# Patient Record
Sex: FEMALE | Race: OTHER | HISPANIC OR LATINO | URBAN - METROPOLITAN AREA
[De-identification: names, ages, dates, MRNs, and addresses within clinical notes are randomized per-mention and may not be internally consistent; named-entity substitution may affect disease eponyms.]

---

## 2023-03-22 NOTE — ASU PATIENT PROFILE, ADULT - NS PREOP UNDERSTANDS INFO
Preop Instructions given to patients daughter, Pt to be NPO for 10 hours before surgery. No chewing gum or hard candy once NPO. No jewellery or make up or body piercing allowed in the OR/yes

## 2023-03-23 ENCOUNTER — OUTPATIENT (OUTPATIENT)
Dept: OUTPATIENT SERVICES | Facility: HOSPITAL | Age: 73
LOS: 1 days | Discharge: HOME | End: 2023-03-23

## 2023-03-23 VITALS
DIASTOLIC BLOOD PRESSURE: 87 MMHG | SYSTOLIC BLOOD PRESSURE: 134 MMHG | OXYGEN SATURATION: 98 % | RESPIRATION RATE: 15 BRPM | HEART RATE: 62 BPM | TEMPERATURE: 97 F

## 2023-03-23 VITALS
SYSTOLIC BLOOD PRESSURE: 125 MMHG | OXYGEN SATURATION: 99 % | TEMPERATURE: 97 F | DIASTOLIC BLOOD PRESSURE: 68 MMHG | RESPIRATION RATE: 15 BRPM | HEIGHT: 66 IN | WEIGHT: 191.58 LBS | HEART RATE: 64 BPM

## 2023-03-23 DIAGNOSIS — Z90.710 ACQUIRED ABSENCE OF BOTH CERVIX AND UTERUS: Chronic | ICD-10-CM

## 2023-03-23 DEVICE — LENS IOL PC ACR SA60AT PLUS 23.0D
Type: IMPLANTABLE DEVICE | Status: NON-FUNCTIONAL
Removed: 2023-03-23

## 2023-03-23 RX ORDER — PHENYLEPHRINE HCL 2.5 %
1 DROPS OPHTHALMIC (EYE)
Refills: 0 | Status: COMPLETED | OUTPATIENT
Start: 2023-03-23 | End: 2023-03-23

## 2023-03-23 RX ORDER — TROPICAMIDE 1 %
1 DROPS OPHTHALMIC (EYE)
Refills: 0 | Status: COMPLETED | OUTPATIENT
Start: 2023-03-23 | End: 2023-03-23

## 2023-03-23 RX ORDER — KETOROLAC TROMETHAMINE 0.5 %
1 DROPS OPHTHALMIC (EYE)
Refills: 0 | Status: COMPLETED | OUTPATIENT
Start: 2023-03-23 | End: 2023-03-23

## 2023-03-23 RX ORDER — ATROPINE SULFATE 1 %
1 DROPS OPHTHALMIC (EYE)
Refills: 0 | Status: COMPLETED | OUTPATIENT
Start: 2023-03-23 | End: 2023-03-23

## 2023-03-23 RX ORDER — OFLOXACIN 0.3 %
1 DROPS OPHTHALMIC (EYE)
Refills: 0 | Status: COMPLETED | OUTPATIENT
Start: 2023-03-23 | End: 2023-03-23

## 2023-03-23 RX ORDER — ACETAMINOPHEN 500 MG
2 TABLET ORAL
Qty: 0 | Refills: 0 | DISCHARGE
Start: 2023-03-23

## 2023-03-23 RX ORDER — SODIUM CHLORIDE 9 MG/ML
1000 INJECTION, SOLUTION INTRAVENOUS
Refills: 0 | Status: DISCONTINUED | OUTPATIENT
Start: 2023-03-23 | End: 2023-03-23

## 2023-03-23 RX ORDER — ACETAMINOPHEN 500 MG
650 TABLET ORAL ONCE
Refills: 0 | Status: DISCONTINUED | OUTPATIENT
Start: 2023-03-23 | End: 2023-03-23

## 2023-03-23 RX ADMIN — Medication 1 DROP(S): at 14:53

## 2023-03-23 RX ADMIN — Medication 1 DROP(S): at 15:02

## 2023-03-23 RX ADMIN — Medication 1 DROP(S): at 14:46

## 2023-03-23 RX ADMIN — Medication 1 DROP(S): at 15:01

## 2023-03-23 RX ADMIN — Medication 1 DROP(S): at 14:52

## 2023-03-23 RX ADMIN — Medication 1 DROP(S): at 14:45

## 2023-03-23 NOTE — OPERATIVE REPORT - OPERATIVE RPOSRT DETAILS
PRE-OP DIAGNOSIS: Cataract__left____ EYE    POST-OP DIAGNOSIS: SAME    ANESTHESIA: MAC    PROCEDURE: Cataract____left__ EYE    SPECIMEN/TISSUE REMOVED: None    ESTIMATED BLOOD LOSS: < 1mL    COMPLICATIONS: None    The patient was brought into the operating room, where an intravenous line was inserted.  The patient was then prepped and draped in the usual sterile fashion for eye surgery of the operated eye.  The lid speculum was inserted into the operated eye.     A paracentesis was performed using a fifteen degree blade.  One percent preservative free lidocaine was injected into the anterior chamber.  Trypan blue was injected into the anterior chamber and irrigated out with balanced salt solution.  The anterior chamber was filled with Viscoat. A 2.75 mm keratome was used to make a clear corneal incision.  The cytotome and Utrata forceps were used to make a continuous curvilinear capsulorrhexis.  The lens was hydrodissected and hydrodelineated with balanced salt solution, until it was freely movable.   The phacoemulsification handpiece was used to groove the lens nucleus into four quadrants, which were then removed.  The remaining cortex was removed using irrigation and aspiration.  The anterior chamber and capsular bag were filled with Healon, and the posterior capsule was noted to be clear and intact.    An Betito lens model SA60AT power ___23.0_ was injected into the capsular bag without complications.  The lens was centered with a Sinsky hook. The remaining Healon was removed with irrigation and aspiration on the viscoelastic setting.  Miochol was injected into the anterior chamber to constrict the pupil and pull the iris from the corneal wounds.  The anterior chamber was maintained with balanced salt solution and the corneal wounds were hydrated, and noted to be tight and secure.  The lid speculum was removed from the eye.  The patient received topical Vigamox and pred forte eye drops.  The patient also received Tobradex eye ointment, and the eye was patched and shielded.  The patient was brought to the recovery room in stable condition, alert and oriented times three.  The patient was reminded to follow up in the office the next day.

## 2023-03-29 NOTE — ASU PATIENT PROFILE, ADULT - NSICDXPASTMEDICALHX_GEN_ALL_CORE_FT
PAST MEDICAL HISTORY:  Mild HTN no meds    Ovarian cancer      PAST MEDICAL HISTORY:  Mild HTN no meds    Ovarian cancer 1999

## 2023-03-29 NOTE — ASU PATIENT PROFILE, ADULT - FALL HARM RISK - UNIVERSAL INTERVENTIONS
Bed in lowest position, wheels locked, appropriate side rails in place/Call bell, personal items and telephone in reach/Instruct patient to call for assistance before getting out of bed or chair/Non-slip footwear when patient is out of bed/[place] to call system/Physically safe environment - no spills, clutter or unnecessary equipment/Purposeful Proactive Rounding/Room/bathroom lighting operational, light cord in reach

## 2023-03-30 ENCOUNTER — OUTPATIENT (OUTPATIENT)
Dept: OUTPATIENT SERVICES | Facility: HOSPITAL | Age: 73
LOS: 1 days | Discharge: HOME | End: 2023-03-30

## 2023-03-30 VITALS
HEIGHT: 66 IN | OXYGEN SATURATION: 97 % | DIASTOLIC BLOOD PRESSURE: 70 MMHG | WEIGHT: 192.02 LBS | TEMPERATURE: 97 F | RESPIRATION RATE: 14 BRPM | SYSTOLIC BLOOD PRESSURE: 128 MMHG | HEART RATE: 63 BPM

## 2023-03-30 VITALS
OXYGEN SATURATION: 98 % | HEART RATE: 61 BPM | SYSTOLIC BLOOD PRESSURE: 127 MMHG | TEMPERATURE: 99 F | RESPIRATION RATE: 16 BRPM | DIASTOLIC BLOOD PRESSURE: 62 MMHG

## 2023-03-30 DIAGNOSIS — Z90.710 ACQUIRED ABSENCE OF BOTH CERVIX AND UTERUS: Chronic | ICD-10-CM

## 2023-03-30 DEVICE — LENS IOL PC ACR SA60AT PLUS 22.5D
Type: IMPLANTABLE DEVICE | Site: RIGHT (RIGHT EYE) | Status: NON-FUNCTIONAL
Removed: 2023-03-30

## 2023-03-30 RX ORDER — TROPICAMIDE 1 %
1 DROPS OPHTHALMIC (EYE)
Refills: 0 | Status: COMPLETED | OUTPATIENT
Start: 2023-03-30 | End: 2023-03-30

## 2023-03-30 RX ORDER — PHENYLEPHRINE HCL 2.5 %
1 DROPS OPHTHALMIC (EYE)
Refills: 0 | Status: COMPLETED | OUTPATIENT
Start: 2023-03-30 | End: 2023-03-30

## 2023-03-30 RX ORDER — OFLOXACIN 0.3 %
1 DROPS OPHTHALMIC (EYE)
Refills: 0 | Status: COMPLETED | OUTPATIENT
Start: 2023-03-30 | End: 2023-03-30

## 2023-03-30 RX ORDER — SODIUM CHLORIDE 9 MG/ML
1000 INJECTION, SOLUTION INTRAVENOUS
Refills: 0 | Status: DISCONTINUED | OUTPATIENT
Start: 2023-03-30 | End: 2023-03-30

## 2023-03-30 RX ORDER — ACETAMINOPHEN 500 MG
650 TABLET ORAL ONCE
Refills: 0 | Status: DISCONTINUED | OUTPATIENT
Start: 2023-03-30 | End: 2023-03-30

## 2023-03-30 RX ORDER — KETOROLAC TROMETHAMINE 0.5 %
1 DROPS OPHTHALMIC (EYE)
Refills: 0 | Status: COMPLETED | OUTPATIENT
Start: 2023-03-30 | End: 2023-03-30

## 2023-03-30 RX ORDER — ONDANSETRON 8 MG/1
4 TABLET, FILM COATED ORAL ONCE
Refills: 0 | Status: DISCONTINUED | OUTPATIENT
Start: 2023-03-30 | End: 2023-03-30

## 2023-03-30 RX ORDER — ATROPINE SULFATE 1 %
1 DROPS OPHTHALMIC (EYE)
Refills: 0 | Status: COMPLETED | OUTPATIENT
Start: 2023-03-30 | End: 2023-03-30

## 2023-03-30 RX ADMIN — Medication 1 DROP(S): at 13:44

## 2023-03-30 RX ADMIN — Medication 1 DROP(S): at 13:49

## 2023-03-30 RX ADMIN — Medication 1 DROP(S): at 13:55

## 2023-03-30 RX ADMIN — Medication 1 DROP(S): at 13:48

## 2023-03-30 RX ADMIN — Medication 1 DROP(S): at 13:57

## 2023-03-30 RX ADMIN — Medication 1 DROP(S): at 13:43

## 2023-03-30 NOTE — OPERATIVE REPORT - OPERATIVE RPOSRT DETAILS
PRE-OP DIAGNOSIS: Cataract__right___ EYE    POST-OP DIAGNOSIS: SAME    ANESTHESIA: MAC    PROCEDURE: Cataract____right _ EYE    SPECIMEN/TISSUE REMOVED: None    ESTIMATED BLOOD LOSS: < 1mL    COMPLICATIONS: None    The patient was brought into the operating room, where an intravenous line was inserted.  The patient was then prepped and draped in the usual sterile fashion for eye surgery of the operated eye.  The lid speculum was inserted into the operated eye.     A paracentesis was performed using a fifteen degree blade.  One percent preservative free lidocaine was injected into the anterior chamber.  Epinephrine was injected into the anterior chamber. Trypan blue was injected into the anterior chamber and irrigated out with balanced salt solution.  The anterior chamber was filled with Viscoat. A 2.75 mm keratome was used to make a clear corneal incision.  The cytotome and Utrata forceps were used to make a continuous curvilinear capsulorrhexis.  The lens was hydrodissected and hydrodelineated with balanced salt solution, until it was freely movable.   The phacoemulsification handpiece was used to groove the lens nucleus into four quadrants, which were then removed.  The remaining cortex was removed using irrigation and aspiration.  The anterior chamber and capsular bag were filled with Healon, and the posterior capsule was noted to be clear and intact.    An Betito lens model SA60AT power ___22.5_ was injected into the capsular bag without complications.  The lens was centered with a Sinsky hook. The remaining Healon was removed with irrigation and aspiration on the viscoelastic setting.  Miochol was injected into the anterior chamber to constrict the pupil and pull the iris from the corneal wounds.  The anterior chamber was maintained with balanced salt solution and the corneal wounds were hydrated, and noted to be tight and secure.  The lid speculum was removed from the eye.  The patient received topical Vigamox and pred forte eye drops.  The patient also received Tobradex eye ointment, and the eye was patched and shielded.  The patient was brought to the recovery room in stable condition, alert and oriented times three.  The patient was reminded to follow up in the office the next day.

## 2023-10-30 NOTE — ASU PATIENT PROFILE, ADULT - NS TRANSFER DISPOSITION PATIENT BELONGINGS
Nutrition Assessment   Reason for Consult/Assessment: Initial      Diagnosis and Hx: Reviewed    Pertinent Nutrition History: COVID, sepsis.    Pertinent Nutrition Information: Pt is underweight. Pt was a asleep at time of visit. Per RN pt takes a few bites of food and then falls back asleep. Very poor po intake. No recent weight hx to review.                                 Diet Order: IDDSI 4 Pureed (Dysphagia)                  Diet tolerance: Tolerating   Food Allergies: None known    Demographic/Anthropometrics Information  Gender: male  Patient Age: 74 year old  Height:   Ht Readings from Last 1 Encounters:   10/27/23 5' 6\" (1.676 m)      Weight:   Wt Readings from Last 1 Encounters:   10/30/23 50.5 kg      BMI:   BMI Readings from Last 1 Encounters:   10/30/23 17.97 kg/m²       Usual Weight: 50.5 kg  % Weight Change: no hx                                             NFPE  Nutrition Focused Physical Exam  Physical Exam Completed: No (pt sleeping, airborne isolation)        Skin Assessment/Wounds  Wounds Present: Wounds present (sacral ulcer)             TREATMENT PLAN: Monitoring & Interventions   Intervention: Coordination of nutrition care by a nutrition professional, Meals and snacks, Nutrition supplement therapy      Coordination of nutrition care: Encourage po intake. IF poor po continues will need to consider alternative forms of nutrition        Meals & snacks: Diet per speech                                                           Nutrition supplement therapy: Add Magic cup tid.       Goal: Increase oral intake to >/equal 50% of meals and supplements   Intervention goal status: Initiated  Time frame to achieve goal: 3-5 days     Dietitian will monitor: Anthropometric Measurements, Food, beverage, and nutrient intake, Biochemical data, medical tests, procedures            Nutrition Diagnosis / PES  Nutrition Diagnosis: Inadequate energy intake  Related to: Decreased intake   As evidenced by:  Documented/reported poor oral intake      Primary Nutrition Diagnosis status: New nutrition diagnosis                    with patient

## 2024-11-30 NOTE — PACU DISCHARGE NOTE - PAIN:
Sleep observed as sound during shift, no respiratory distress.  Awake at 0400, complained of increased restlessness.propranolol (INDERAL) tablet 5 mg given PO at 0448. Returned to bed and is resting currently.  Continues on q 15 minute safety checks.  Clutter free environment continued to prevent falls. Fluids maintained at beside to promote hydration. Walker at bedside. Will continue to monitor.     Controlled with current regime

## (undated) DEVICE — DRAPE MICROSCOPE KNOB COVER SMALL (2 PCS)

## (undated) DEVICE — KNIFE ALCON STANDARD FULL HANDLE 15 DEG (PINK)

## (undated) DEVICE — PACK CENTURION 2.75MM

## (undated) DEVICE — KIT CENTURION ANTERIOR

## (undated) DEVICE — SOL IRR BAL SALT 500ML

## (undated) DEVICE — GLV 6 PROTEXIS (WHITE)

## (undated) DEVICE — KNIFE FULL HANDLE ANGLE 2.75MM

## (undated) DEVICE — KNIFE ALCON MVR V-LANCE 20G (WHITE)

## (undated) DEVICE — PACK ANTERIOR SEGMENT

## (undated) DEVICE — WARMING BLANKET LOWER ADULT

## (undated) DEVICE — CAPSULE GUARD I/A

## (undated) DEVICE — SUT NYLON 10-0 12" CU-5